# Patient Record
Sex: FEMALE | Race: WHITE
[De-identification: names, ages, dates, MRNs, and addresses within clinical notes are randomized per-mention and may not be internally consistent; named-entity substitution may affect disease eponyms.]

---

## 2017-02-13 ENCOUNTER — HOSPITAL ENCOUNTER (OUTPATIENT)
Dept: HOSPITAL 45 - C.LABSPEC | Age: 27
Discharge: HOME | End: 2017-02-13
Attending: OBSTETRICS & GYNECOLOGY
Payer: COMMERCIAL

## 2017-02-13 ENCOUNTER — HOSPITAL ENCOUNTER (OUTPATIENT)
Dept: HOSPITAL 45 - C.LAB1850 | Age: 27
Discharge: HOME | End: 2017-02-13
Attending: OBSTETRICS & GYNECOLOGY
Payer: COMMERCIAL

## 2017-02-13 DIAGNOSIS — Z34.01: Primary | ICD-10-CM

## 2017-02-13 LAB
APPEARANCE UR: (no result)
BILIRUB UR-MCNC: (no result) MG/DL
COLOR UR: YELLOW
GTGD: 50 GRAMS
HCT VFR BLD CALC: 36.2 % (ref 37–47)
MANUAL MICROSCOPIC REQUIRED?: NO
NITRITE UR QL STRIP: (no result)
PH UR STRIP: 5.5 [PH] (ref 4.5–7.5)
REVIEW REQ?: YES
SP GR UR STRIP: 1.04 (ref 1–1.03)
URINE EPITHELIAL CELL AUTO: >30 /LPF (ref 0–5)
UROBILINOGEN UR-MCNC: (no result) MG/DL

## 2017-04-13 ENCOUNTER — HOSPITAL ENCOUNTER (OUTPATIENT)
Dept: HOSPITAL 45 - C.LABSPEC | Age: 27
Discharge: HOME | End: 2017-04-13
Attending: OBSTETRICS & GYNECOLOGY
Payer: COMMERCIAL

## 2017-04-13 DIAGNOSIS — Z34.03: Primary | ICD-10-CM

## 2017-04-25 ENCOUNTER — HOSPITAL ENCOUNTER (INPATIENT)
Dept: HOSPITAL 45 - C.LD | Age: 27
LOS: 2 days | Discharge: HOME | End: 2017-04-27
Attending: OBSTETRICS & GYNECOLOGY | Admitting: OBSTETRICS & GYNECOLOGY
Payer: COMMERCIAL

## 2017-04-25 VITALS
WEIGHT: 220.46 LBS | HEIGHT: 62.01 IN | HEIGHT: 62.01 IN | WEIGHT: 220.46 LBS | BODY MASS INDEX: 40.06 KG/M2 | BODY MASS INDEX: 40.06 KG/M2

## 2017-04-25 DIAGNOSIS — Z3A.38: ICD-10-CM

## 2017-04-25 LAB
BASOPHILS # BLD: 0.02 K/UL (ref 0–0.2)
BASOPHILS NFR BLD: 0.2 %
COMPLETE: YES
EOSINOPHIL NFR BLD AUTO: 259 K/UL (ref 130–400)
HCT VFR BLD CALC: 34.6 % (ref 37–47)
IG%: 0.9 %
IMM GRANULOCYTES NFR BLD AUTO: 22.2 %
LYMPHOCYTES # BLD: 2.18 K/UL (ref 1.2–3.4)
MCH RBC QN AUTO: 27.8 PG (ref 25–34)
MCHC RBC AUTO-ENTMCNC: 32.1 G/DL (ref 32–36)
MCV RBC AUTO: 86.5 FL (ref 80–100)
MONOCYTES NFR BLD: 8.8 %
NEUTROPHILS # BLD AUTO: 1.3 %
NEUTROPHILS NFR BLD AUTO: 66.6 %
PMV BLD AUTO: 10.1 FL (ref 7.4–10.4)
RBC # BLD AUTO: 4 M/UL (ref 4.2–5.4)
WBC # BLD AUTO: 9.81 K/UL (ref 4.8–10.8)

## 2017-04-25 RX ADMIN — SODIUM CHLORIDE, SODIUM LACTATE, POTASSIUM CHLORIDE, AND CALCIUM CHLORIDE SCH MLS/HR: 600; 310; 30; 20 INJECTION, SOLUTION INTRAVENOUS at 12:47

## 2017-04-25 RX ADMIN — SODIUM CHLORIDE, SODIUM LACTATE, POTASSIUM CHLORIDE, AND CALCIUM CHLORIDE SCH MLS/HR: 600; 310; 30; 20 INJECTION, SOLUTION INTRAVENOUS at 04:46

## 2017-04-25 RX ADMIN — SODIUM CHLORIDE, SODIUM LACTATE, POTASSIUM CHLORIDE, AND CALCIUM CHLORIDE SCH MLS/HR: 600; 310; 30; 20 INJECTION, SOLUTION INTRAVENOUS at 19:51

## 2017-04-25 RX ADMIN — Medication PRN MG: at 23:55

## 2017-04-25 RX ADMIN — SODIUM CHLORIDE, SODIUM LACTATE, POTASSIUM CHLORIDE, AND CALCIUM CHLORIDE SCH MLS/HR: 600; 310; 30; 20 INJECTION, SOLUTION INTRAVENOUS at 14:56

## 2017-04-26 VITALS
DIASTOLIC BLOOD PRESSURE: 66 MMHG | TEMPERATURE: 97.88 F | SYSTOLIC BLOOD PRESSURE: 105 MMHG | HEART RATE: 85 BPM | OXYGEN SATURATION: 98 %

## 2017-04-26 VITALS
SYSTOLIC BLOOD PRESSURE: 124 MMHG | OXYGEN SATURATION: 97 % | DIASTOLIC BLOOD PRESSURE: 79 MMHG | TEMPERATURE: 98.42 F | HEART RATE: 81 BPM

## 2017-04-26 VITALS
OXYGEN SATURATION: 100 % | SYSTOLIC BLOOD PRESSURE: 132 MMHG | TEMPERATURE: 97.88 F | DIASTOLIC BLOOD PRESSURE: 82 MMHG | HEART RATE: 86 BPM

## 2017-04-26 VITALS
TEMPERATURE: 97.88 F | SYSTOLIC BLOOD PRESSURE: 116 MMHG | OXYGEN SATURATION: 98 % | DIASTOLIC BLOOD PRESSURE: 79 MMHG | HEART RATE: 85 BPM

## 2017-04-26 VITALS — HEART RATE: 100 BPM | SYSTOLIC BLOOD PRESSURE: 123 MMHG | DIASTOLIC BLOOD PRESSURE: 76 MMHG | TEMPERATURE: 97.88 F

## 2017-04-26 VITALS
HEART RATE: 84 BPM | SYSTOLIC BLOOD PRESSURE: 109 MMHG | OXYGEN SATURATION: 98 % | TEMPERATURE: 97.7 F | DIASTOLIC BLOOD PRESSURE: 63 MMHG

## 2017-04-26 VITALS — DIASTOLIC BLOOD PRESSURE: 82 MMHG | TEMPERATURE: 97.88 F | SYSTOLIC BLOOD PRESSURE: 118 MMHG | HEART RATE: 90 BPM

## 2017-04-26 LAB — HCT VFR BLD CALC: 26.7 % (ref 37–47)

## 2017-04-26 RX ADMIN — BENZOCAINE AND LEVOMENTHOL PRN APPLN: 200; 5 SPRAY TOPICAL at 05:58

## 2017-04-26 RX ADMIN — Medication PRN MG: at 05:51

## 2017-04-26 RX ADMIN — Medication PRN MG: at 10:18

## 2017-04-26 RX ADMIN — Medication PRN MG: at 23:54

## 2017-04-26 RX ADMIN — DOCUSATE SODIUM SCH MG: 100 CAPSULE, LIQUID FILLED ORAL at 08:41

## 2017-04-26 RX ADMIN — Medication PRN MG: at 18:37

## 2017-04-26 RX ADMIN — DOCUSATE SODIUM SCH MG: 100 CAPSULE, LIQUID FILLED ORAL at 20:15

## 2017-04-26 RX ADMIN — Medication PRN MG: at 14:15

## 2017-04-26 RX ADMIN — Medication SCH TAB: at 08:41

## 2017-04-26 NOTE — ANESTHESIA PROCEDURE NOTE
Anesthesia Epidural Removal Nt


Date & Time


Apr 26, 2017 at 07:16





Vital Signs


Pain Intensity:  1





 Vital Signs Past 12 Hours








  Date Time  Temp Pulse Resp B/P Pulse Ox O2 Delivery O2 Flow Rate FiO2


 


4/26/17 05:45 36.9 81 16 124/79 97 Room Air  


 


4/26/17 01:35      Room Air  


 


4/26/17 01:35 36.6 100 20 123/76  Room Air  











Notes


Mental Status:  alert / awake / arousable, participated in evaluation


Nausea / Vomiting:  adequately controlled


Pain:  adequately controlled


Airway Patency, RR, SpO2:  stable & adequate


BP & HR:  stable & adequate


Hydration State:  stable & adequate


Neuraxial Anesthesia:  was administered


Anesthetic Complications:  no major complications apparent, pt satisfied with 

anesthetic care


Epidural:  removed without complications, with tip intact

## 2017-04-26 NOTE — DELIVERY SUMMARY
DATE OF OPERATION:  04/25/2017

 

Carrie was admitted by Dr. Coles with spontaneous rupture of membranes.  She

was group B strep negative and 38 weeks and 2 days.  She was then augmented

with Pitocin and she was having contractions.  She progressed to 3 cm and

requested epidural.  AROM was then performed as there was a bulging forebag

and fluid was clear.  She then progressed to 5 cm and then soon afterwards to

fully dilated.  She pushed just over 2 hours, was getting somewhat exhausted.

 I offered small episiotomy as the patient was tearing and she agreed to

this.  Right medial lateral episiotomy was done.  We did prior injection with

local anesthetic without epinephrine.  Baby's head was then delivered.  Baby

did somewhat of a turtle sign then.  So we then lowered the maternal head and

brought the legs back in Judy maneuver.  I was then able to reach the

anterior shoulder on the posterior aspect and gentle traction was used to

deliver the baby without difficulty.  No excessive force was used.  Live

vigorous male infant.  Cord clamped and cut.  Cord gases obtained.  Cord

blood obtained.  Placenta removed with gentle traction.  The repair was

performed by injecting additional local anesthetic and then closed with 3-0

Vicryl.  It should be noted that we had to extract several clots from the

uterus afterwards as well, as there was some increased bleeding but this

responded to increasing Pitocin and removal of the clots and uterus.  Rectal

exam was negative for sutures.  Sponge and instrument counts were correct. 

Estimated blood loss 300 mL.

 

 

I attest to the content of the Intraoperative Record and any orders documented therein. Any exceptio
ns are noted below.

## 2017-04-26 NOTE — PROGRESS NOTE
Subjective


2017.


Subjective


conversation w/ patient, physical exam, lab review


Ambulation:  ambulating normally


Voiding:  no voiding problems


Passing Gas:  No


Diet Tolerance:  Regular Diet


Lochia:  Moderate


Feeding Type:  Bottle Feeding


Pain:  4/10 improves with medication


Comment:


Patient was seen at the bedside. No acute event overnight.





Review of Systems


Constitutional:  No fever


Respiratory:  No shortness of breath


Cardiac:  No chest pain


Breast:  No breast lump


Abdomen:  No nausea, No pain, No vomiting


Female :  No dysuria, No urinary frequency


Denies headache





Objective


Vital Signs











  Date Time  Temp Pulse Resp B/P Pulse Ox O2 Delivery O2 Flow Rate FiO2


 


17 05:45 36.9 81 16 124/79 97 Room Air  


 


17 01:35      Room Air  


 


17 01:35 36.6 100 20 123/76  Room Air  











Physical Exam


General Appearance:  WELL-APPEARING, WD/WN, NO APPARENT DISTRESS


Respiratory/Chest:  chest non-tender, lungs clear, normal breath sounds


Cardiovascular:  regular rate, rhythm


Abdomen:  normal bowel sounds, non tender, soft


Fundus:  Firm, Relation to Umbilicus (1cm below)


Extremities:  non-tender, no calf tenderness, + pedal edema (1+ ankle pitting 

edema L>R)





Laboratory Results





Last 24 Hours








Test


  17


04:44











Medications





 Current Inpatient Medications








 Medications


  (Trade)  Dose


 Ordered  Sig/Jina


 Route  Start Time


 Stop Time Status Last Admin


Dose Admin


 


 Lactated Ringer's  1,000 ml @ 


 125 mls/hr  Q8H


 IV  17 02:24


 17 02:23  17 19:51


125 MLS/HR


 


 Lactated Ringer's


  (Lr 1000ml)  500 ml @ 


 999 mls/hr  Q31M PRN


 IV  17 04:29


 17 04:28   


 


 


 Oxytocin


  (Pitocin IV)  30 units  UD  PRN


 IV  17 23:30


 17 23:29  17 23:48


30 UNITS


 


 Benzocaine


  (Dermoplast Aero


 Spr)  1 appln  PRN  PRN


 EXT  17 23:30


 17 23:29  17 05:58


1 APPLN


 


 Cocaine HCl


  (Supercream


 0.870% Cr)    BID  PRN


 EXT  17 23:30


 17 23:29   


 


 


 Hydrocortisone


 Acetate


  (Anusol Hc Supp)  25 mg  BID  PRN


 [place]  17 23:30


 17 23:29   


 


 


 Lanolin


  (Lanolin Oint)    PRN  PRN


 EXT  17 23:30


 17 23:29   


 


 


 Prenat Multivit/


 [place]/Iron/Folic


 Ac


  (Prenatal


 Vitamin Tab)  1 tab  DAILY


 PO  17 08:00


 17 07:59   


 


 


 Ibuprofen


  (Motrin Tab)  600 mg  Q4H  PRN


 PO  17 23:30


 17 23:29  17 05:51


600 MG


 


 Acetaminophen


  (Tylenol Tab)  650 mg  Q6H  PRN


 PO  17 23:30


 17 23:29   


 


 


 Acetaminophen/


 Codeine Phosphate


  (Tylenol w/


 Codeine #3 Tab)  1 tab  Q4H  PRN


 PO  17 23:30


 17 23:29   


 


 


 Acetaminophen/


 Codeine Phosphate


  (Tylenol w/


 Codeine #3 Tab)  2 tab  Q4H  PRN


 PO  17 23:30


 17 23:29   


 


 


 Bisacodyl


  (Dulcolax Tab)  5 mg  20


 PO  17 20:00


 17 20:01   


 


 


 Bisacodyl


  (Dulcolax Supp)  10 mg  DAILY  PRN


 [place]  17 07:00


     


 


 


 Docusate Sodium


  (coLACE CAP)  100 mg  BID


 PO  17 08:00


 17 07:59   


 


 


 Diphtheria/


 Pertussis/Tetanus


 Vacc


  (Adacel Inj)  0.5 ml  ONCE ONCE


 IM.  17 09:00


 17 09:01   


 











Assessment and Plan


Post-Partum


Day#:  1


Continue Routine Care:


A/P:





This is a 27 y/o female, , s/p normal vaginal delivery. She is ambulating 

and clinically stable.





Plan:


      - Vitals signs are reviewed and WNL (Tmax 36.9 )


      - Last Hgb is 11.1


      - Blood type O+, GBS neg, Rubella Immune


      - Routine postpartum care 


      - Encourage ambulation, monitor and control pain with medication as needed

, continue with regular diet as tolerated and monitor lochia


      - Stool softeners and sitz bath recommended





Resident Physician Supervision Note:


I interviewed and examined the patient. Discussed with Dr. Temple and agree with 

findings and plan as documented in the note. Any exceptions or clarifications 

are listed here: [None]





Documented By:  Shakir Chaudhry

## 2017-04-27 VITALS
DIASTOLIC BLOOD PRESSURE: 73 MMHG | OXYGEN SATURATION: 100 % | HEART RATE: 64 BPM | TEMPERATURE: 97.7 F | SYSTOLIC BLOOD PRESSURE: 104 MMHG

## 2017-04-27 VITALS — TEMPERATURE: 97.7 F | HEART RATE: 61 BPM | DIASTOLIC BLOOD PRESSURE: 73 MMHG

## 2017-04-27 VITALS — TEMPERATURE: 97.7 F | HEART RATE: 61 BPM | DIASTOLIC BLOOD PRESSURE: 73 MMHG | SYSTOLIC BLOOD PRESSURE: 104 MMHG

## 2017-04-27 LAB
EOSINOPHIL NFR BLD AUTO: 237 K/UL (ref 130–400)
HCT VFR BLD CALC: 28.6 % (ref 37–47)
MCH RBC QN AUTO: 28.7 PG (ref 25–34)
MCHC RBC AUTO-ENTMCNC: 32.5 G/DL (ref 32–36)
MCV RBC AUTO: 88.3 FL (ref 80–100)
PMV BLD AUTO: 10 FL (ref 7.4–10.4)
RBC # BLD AUTO: 3.24 M/UL (ref 4.2–5.4)
WBC # BLD AUTO: 14.23 K/UL (ref 4.8–10.8)

## 2017-04-27 RX ADMIN — Medication PRN MG: at 14:14

## 2017-04-27 RX ADMIN — Medication SCH TAB: at 07:38

## 2017-04-27 RX ADMIN — BENZOCAINE AND LEVOMENTHOL PRN APPLN: 200; 5 SPRAY TOPICAL at 14:13

## 2017-04-27 RX ADMIN — DOCUSATE SODIUM SCH MG: 100 CAPSULE, LIQUID FILLED ORAL at 07:37

## 2017-04-27 RX ADMIN — Medication PRN MG: at 07:39

## 2017-04-27 NOTE — DISCHARGE INSTRUCTIONS
Discharge Instructions


Date of Service


Apr 26, 2017.





Admission


Reason for Admission:  Check Ruptured Membranes





Discharge


Discharge Diagnosis / Problem:  s/p vaginal delivery





Discharge Goals


Goal(s):  Routine recovery after delivery





Medications


Continue Dispensed Medications:  supercream, dermaplast, tucks, lansinoh





Activity Recommendations


Activity Limitations:  as noted below





.





Instructions / Follow-Up


Instructions / Follow-Up





ACTIVITY RECOMMENDATIONS:





* Gradual return to full activity over the next 2-3 weeks.


* No lifting - nothing heavier than baby over the next 2-3 weeks.


* Do not engage in vigorous exercise, sexual activity or sports until cleared by


   your physician.


* Do not drive or operate any motorized equipment until cleared by your 

physician.


* You may shower/bathe daily.








MEDICATIONS:





For discomfort or pain, you may use Acetaminophen (Tylenol), Ibuprofen (Advil),


or Naproxen (Aleve) following the package directions. For constipation you may 


use Colace following the package directions.








BREAST CARE:





If you are not breast feeding:





*  Wear a supportive bra 24 hours a day for one to two weeks.


*  Avoid stimulating your breasts and nipples as much as possible during the 

first 


    few weeks after delivery.


*  When taking a shower, have the warm water hit your back, not breasts.


*  When your breasts feel full, apply ice packs.  Usually three to four times a 

day


    helps ease the discomfort.


*  Take a mild pain medication (Tylenol / Motrin) when you are uncomfortable.





If breast feeding:





*  Use breast milk to lubricate nipples.  Lansinoh cream may be used for sore 

nipples. 


    You do not need to remove cream prior to breast feeding.  If using a 

different brand


   of cream, check the label for directions regarding removal of cream prior to 

nursing.


*  Wear a supportive bra.


*  If having problems with breasts or breast feeding, call a lactation 

consultant 


    or your health care provider.








EPISIOTOMY CARE:





After delivery, if you have an episiotomy (stitches), the following steps will 

ease


discomfort and aid healing.





*  For the first 24 hours after delivery, place ice packs next to your 

episiotomy to


   help reduce swelling.


*  After the first 24 hour-period, sitz baths, either portable or in the tub, 

are suggested.


    A shower with a shower arm sprayed over the episiotomy may be comforting.


*  Maryse care should be done after each voiding and bowel movement.  Squirt warm 

water


    from a plastic bottle over the perineum (region of the body between the 

anus and 


    urinary opening) and pat dry.


*  Use Dermoplast to ease discomfort.  Shake container.  Spray directly over 

the 


    episiotomy.  Place a Tucks on a clean sanitary pad next to your episiotomy.








SPECIAL CARE INSTRUCTIONS:





When you are discharged from the hospital, it is important for you to follow 

the instructions 


listed below:





*  During the first week at home, you should be able to care for yourself and 

your baby.


    In addition, the usual light household activities are encouraged.





*  Limit your activities to the way you feel.  Do not try to clean the house or 

move 


    furniture. Be sensible.





*  If you actively engage in sports and have done so up until the time of your 

delivery, 


    you may resume these activities as soon as you feel able.  This may take up 

to one 


    month or even longer.  Use good judgment.





*  Continue to take your prenatal vitamins for at least six weeks after the 

birth of your baby.





*  Your diet need not be limited unless you were on a special diet before your 

delivery.  


    Breast-feeding mothers need around 2500 calories per day and at least 64-80 

ounces of 


    fluid per day (8 to 10 glasses).





*  You should eat foods from the four major food groups.  Crash diets or fad 

diets are to be 


    avoided.  Eating lean meats, fresh fruits and vegetables, low-fat dairy 

products, high fiber


    foods and a regular exercise program, will help you get back to your pre-

pregnancy weight


    without putting your health at risk.





*  Constipation is sometimes a problem after delivery.  Take a mild laxative as 

needed.  If 


    breast feeding, Milk of Magnesia is acceptable to use. You may use a 

suppository or Fleets


    enema if no episiotomy.





*  A daily shower or tub bath is suggested.  Be sure to thoroughly and gently 

dry the perineum.





*  A bloody vaginal discharge will usually continue until around four weeks 

post partum.  A 


    small amount of bleeding may continue for as long as six weeks.  Vaginal 

discharge changes


    from the bright red bleeding after delivery to pink then brownish and 

finally yellowish-pink 


    before becoming white and disappearing.





*  Bleeding may increase with activity.  Your first period may come in 4-8 

weeks.  If you are 


    breast feeding, your period may be delayed even longer.





*  Enoch (sex) can begin whenever both you and your partner feel 

comfortable and do 


    not have any form of genital infection.  It is recommended that you wait at 

least six weeks


    for internal and external healing to occur.  If you have questions, please 

talk to your health


    care practitioner.  A condom should be used to prevent infection and 

pregnancy.





*  Foreplay, gentle intercourse and lubrication is very important the first 

several times to 


    prevent pain.  A water-based lubricant such as K-Y jelly or Astroglide may 

be used.





*  If you have RH negative blood and your baby is RH positive, you will receive 

RHOGAM by 


    injection prior to discharge.  The nurse will give you a card to keep with 

you that has the


    date and place that you received RHOGAM after delivery.





*  During your prenatal care, you had a Rubella screen done to check for the 

presence of 


    rubella antibodies in your blood.  If your test was negative, you will 

receive a Rubella 


    vaccine prior to discharge.  This vaccine may cause a fever, soreness at 

the injection site


    and flu-like symptoms.  If these symptoms persist, notify your health care 

practitioner.  


    Pregnancy is not advised for one month after a Rubella vaccine.





*  Verbalizes understanding of car seat law as reviewed with patient nursing.





*  Car Seat hand-out given and reviewed with patient by nursing.





*  Shaken baby information reviewed with patient by nursing.


 


Call you doctor if:





*  Heavy bleeding (saturating several pads an hour) or passing clots the size 

of your fist.


*  A fever >101 degrees F (38.3 degrees C) on two occasions four hours apart and

/or chills.


*  Unusual pain in the pelvic or vaginal areas.


* "Baby Blues" lasting longer than two weeks.





If you have any questions or concerns, call your health care practitioner at 


[phone].








FOLLOW UP VISIT:





*  Please call the office at [phone] to schedule a 6 week postpartum 


    examination.  It is important you keep this appointment.  It is important 


    for you to make arrangements for either yearly or twice yearly check-ups 


    thereafter.





Current Hospital Diet


Patient's current hospital diet: Regular OB Diet





Discharge Diet


Recommended Diet:  Regular Diet





Pending Studies


Studies pending at discharge:  no





Medical Emergencies








.


Who to Call and When:





Medical Emergencies:  If at any time you feel your situation is an emergency, 

please call 911 immediately.





.





Non-Emergent Contact


Non-Emergency issues call your:  Gynecologist


Call Non-Emergent contact if:  you have a fever, temperature is above 101





.


.





"Provider Documentation" section prepared by Idalia Temple.








.





VTE Core Measure


Inpt VTE Proph given/why not?:  Treatment not indicated

## 2017-04-27 NOTE — PROGRESS NOTE
Subjective


2017.


Subjective


conversation w/ patient, physical exam, lab review


Ambulation:  ambulating normally


Voiding:  no voiding problems


Passing Gas:  Yes


Diet Tolerance:  Regular Diet


Lochia:  Small


Feeding Type:  Bottle Feeding


Pain:  2/10 improves with meds


Comment:


Patient was seen at the bedside. No acute event overnight.





Review of Systems


Constitutional:  No fever


Respiratory:  No shortness of breath


Cardiac:  No chest pain


Breast:  No breast lump


Abdomen:  No nausea, No pain, No vomiting


Female :  No dysuria


Denies headache





Objective


Vital Signs











  Date Time  Temp Pulse Resp B/P Pulse Ox O2 Delivery O2 Flow Rate FiO2


 


17 23:40 36.6 90 18 118/82  Room Air  


 


17 23:40      Room Air  


 


17 19:30 36.6 85 20 116/79 98 Room Air  


 


17 17:30 36.6 86 18 132/82 100 Room Air  


 


17 15:45      Room Air  


 


17 11:52 36.5 84 20 109/63 98 Room Air  


 


17 08:00      Room Air  


 


17 08:00 36.6 85 16 105/66 98 Room Air  











Physical Exam


General Appearance:  WELL-APPEARING, WD/WN, NO APPARENT DISTRESS


Respiratory/Chest:  chest non-tender, lungs clear, normal breath sounds


Cardiovascular:  regular rate, rhythm, no edema, no gallop


Abdomen:  normal bowel sounds, non tender, soft


Fundus:  Firm, Relation to Umbilicus (1-2cm below)


Extremities:  non-tender, no calf tenderness, + pedal edema





Laboratory Results





Last 24 Hours








Test


  17


07:47 17


04:44


 


Hemoglobin 8.7 g/dL  


 


Hematocrit 26.7 %  











Medications





 Current Inpatient Medications








 Medications


  (Trade)  Dose


 Ordered  Sig/Jina


 Route  Start Time


 Stop Time Status Last Admin


Dose Admin


 


 Lactated Ringer's  1,000 ml @ 


 125 mls/hr  Q8H


 IV  17 02:24


 17 02:23  17 19:51


125 MLS/HR


 


 Lactated Ringer's


  (Lr 1000ml)  500 ml @ 


 999 mls/hr  Q31M PRN


 IV  17 04:29


 17 04:28   


 


 


 Oxytocin


  (Pitocin IV)  30 units  UD  PRN


 IV  17 23:30


 17 23:29  17 23:48


30 UNITS


 


 Benzocaine


  (Dermoplast Aero


 Spr)  1 appln  PRN  PRN


 EXT  17 23:30


 17 23:29  17 05:58


1 APPLN


 


 Cocaine HCl


  (Supercream


 0.870% Cr)    BID  PRN


 EXT  17 23:30


 17 23:29   


 


 


 Hydrocortisone


 Acetate


  (Anusol Hc Supp)  25 mg  BID  PRN


 [place]  17 23:30


 17 23:29   


 


 


 Lanolin


  (Lanolin Oint)    PRN  PRN


 EXT  17 23:30


 17 23:29   


 


 


 Prenat Multivit/


 /Iron/Folic


 Ac


  (Prenatal


 Vitamin Tab)  1 tab  DAILY


 PO  17 08:00


 17 07:59  17 08:41


1 TAB


 


 Ibuprofen


  (Motrin Tab)  600 mg  Q4H  PRN


 PO  17 23:30


 17 23:29  17 23:54


600 MG


 


 Acetaminophen


  (Tylenol Tab)  650 mg  Q6H  PRN


 PO  17 23:30


 17 23:29   


 


 


 Acetaminophen/


 Codeine Phosphate


  (Tylenol w/


 Codeine #3 Tab)  1 tab  Q4H  PRN


 PO  17 23:30


 17 23:29   


 


 


 Acetaminophen/


 Codeine Phosphate


  (Tylenol w/


 Codeine #3 Tab)  2 tab  Q4H  PRN


 PO  17 23:30


 17 23:29   


 


 


 Bisacodyl


  (Dulcolax Supp)  10 mg  DAILY  PRN


 [place]  17 07:00


     


 


 


 Docusate Sodium


  (coLACE CAP)  100 mg  BID


 PO  17 08:00


 17 07:59  17 20:15


100 MG











Assessment and Plan


Post-Partum


Day#:  2


Continue Routine Care:


A/P:





This is a 27 y/o female, , s/p normal vaginal delivery. She is ambulating 

and clinically stable to discharge.





      - Vital signs are reviewed and WNL (Tmax 36.9 )


      - Last Hgb 8.7


      - Blood type O+, GBS neg, Rubella Immune


      - No signs of postpartum depression.


      - Routine postpartum care


      - Discussed resting, feeding, pain control, mastitis, birth control, 

follow up in 6 weeks and reasons to call sooner, if necessary.


      - Continue with pain medication as needed, and continue prenatal vitamins.


      - Encourage breast feeding and educate about breast feeding


      - Patient understands and keen for home.


      - Plan to discharge home








Resident Physician Supervision Note:


I interviewed and examined the patient. Discussed with Dr. Temple and agree with 

findings and plan as documented in the note. Any exceptions or clarifications 

are listed here: [None]





Documented By:  Luh Leroy

## 2018-07-23 ENCOUNTER — HOSPITAL ENCOUNTER (OUTPATIENT)
Dept: HOSPITAL 45 - C.LAB1850 | Age: 28
Discharge: HOME | End: 2018-07-23
Attending: OBSTETRICS & GYNECOLOGY
Payer: COMMERCIAL

## 2018-07-23 DIAGNOSIS — O02.1: Primary | ICD-10-CM

## 2018-07-30 ENCOUNTER — HOSPITAL ENCOUNTER (OUTPATIENT)
Dept: HOSPITAL 45 - C.LAB1850 | Age: 28
Discharge: HOME | End: 2018-07-30
Attending: OBSTETRICS & GYNECOLOGY
Payer: COMMERCIAL

## 2018-07-30 DIAGNOSIS — O02.1: Primary | ICD-10-CM

## 2018-08-16 ENCOUNTER — HOSPITAL ENCOUNTER (OUTPATIENT)
Dept: HOSPITAL 45 - C.LAB1850 | Age: 28
Discharge: HOME | End: 2018-08-16
Attending: OBSTETRICS & GYNECOLOGY
Payer: COMMERCIAL

## 2018-08-16 DIAGNOSIS — O02.1: Primary | ICD-10-CM

## 2018-08-24 ENCOUNTER — HOSPITAL ENCOUNTER (OUTPATIENT)
Dept: HOSPITAL 45 - C.LAB1850 | Age: 28
Discharge: HOME | End: 2018-08-24
Attending: OBSTETRICS & GYNECOLOGY
Payer: COMMERCIAL

## 2018-08-24 DIAGNOSIS — O02.1: Primary | ICD-10-CM
